# Patient Record
Sex: FEMALE | Race: WHITE
[De-identification: names, ages, dates, MRNs, and addresses within clinical notes are randomized per-mention and may not be internally consistent; named-entity substitution may affect disease eponyms.]

---

## 2017-02-27 ENCOUNTER — HOSPITAL ENCOUNTER (OUTPATIENT)
Dept: HOSPITAL 61 - PCVCCLINIC | Age: 67
Discharge: HOME | End: 2017-02-27
Attending: INTERNAL MEDICINE
Payer: MEDICARE

## 2017-02-27 DIAGNOSIS — I25.10: Primary | ICD-10-CM

## 2017-02-27 DIAGNOSIS — I10: ICD-10-CM

## 2017-02-27 DIAGNOSIS — E78.5: ICD-10-CM

## 2017-02-27 PROCEDURE — 93005 ELECTROCARDIOGRAM TRACING: CPT

## 2017-02-27 PROCEDURE — G0463 HOSPITAL OUTPT CLINIC VISIT: HCPCS

## 2017-02-27 PROCEDURE — 80061 LIPID PANEL: CPT

## 2018-03-12 ENCOUNTER — HOSPITAL ENCOUNTER (OUTPATIENT)
Dept: HOSPITAL 61 - PCVCIMAG | Age: 68
Discharge: HOME | End: 2018-03-12
Attending: INTERNAL MEDICINE
Payer: MEDICARE

## 2018-03-12 DIAGNOSIS — I10: ICD-10-CM

## 2018-03-12 DIAGNOSIS — Z87.891: ICD-10-CM

## 2018-03-12 DIAGNOSIS — E78.5: ICD-10-CM

## 2018-03-12 DIAGNOSIS — Z79.899: ICD-10-CM

## 2018-03-12 DIAGNOSIS — I25.10: ICD-10-CM

## 2018-03-12 DIAGNOSIS — I65.23: Primary | ICD-10-CM

## 2018-03-12 DIAGNOSIS — Z79.82: ICD-10-CM

## 2018-03-12 PROCEDURE — 93005 ELECTROCARDIOGRAM TRACING: CPT

## 2018-03-12 PROCEDURE — 80061 LIPID PANEL: CPT

## 2018-03-12 PROCEDURE — 93880 EXTRACRANIAL BILAT STUDY: CPT

## 2018-09-12 ENCOUNTER — HOSPITAL ENCOUNTER (OUTPATIENT)
Dept: HOSPITAL 61 - PCVCCLINIC | Age: 68
Discharge: HOME | End: 2018-09-12
Attending: INTERNAL MEDICINE
Payer: MEDICARE

## 2018-09-12 DIAGNOSIS — I10: ICD-10-CM

## 2018-09-12 DIAGNOSIS — Z87.891: ICD-10-CM

## 2018-09-12 DIAGNOSIS — I25.10: Primary | ICD-10-CM

## 2018-09-12 DIAGNOSIS — Z79.82: ICD-10-CM

## 2018-09-12 DIAGNOSIS — I65.23: ICD-10-CM

## 2018-09-12 DIAGNOSIS — E78.5: ICD-10-CM

## 2018-09-12 PROCEDURE — 93005 ELECTROCARDIOGRAM TRACING: CPT

## 2018-09-12 PROCEDURE — G0463 HOSPITAL OUTPT CLINIC VISIT: HCPCS

## 2018-09-12 PROCEDURE — 80061 LIPID PANEL: CPT

## 2019-03-13 ENCOUNTER — HOSPITAL ENCOUNTER (OUTPATIENT)
Dept: HOSPITAL 61 - PCVCIMAG | Age: 69
Discharge: HOME | End: 2019-03-13
Attending: INTERNAL MEDICINE
Payer: MEDICARE

## 2019-03-13 DIAGNOSIS — E78.5: ICD-10-CM

## 2019-03-13 DIAGNOSIS — I65.23: Primary | ICD-10-CM

## 2019-03-13 DIAGNOSIS — I10: ICD-10-CM

## 2019-03-13 DIAGNOSIS — I25.10: ICD-10-CM

## 2019-03-13 PROCEDURE — 93325 DOPPLER ECHO COLOR FLOW MAPG: CPT

## 2019-03-13 PROCEDURE — 93351 STRESS TTE COMPLETE: CPT

## 2019-03-13 PROCEDURE — 93880 EXTRACRANIAL BILAT STUDY: CPT

## 2019-03-13 NOTE — PCVCIMAG
--------------- APPROVED REPORT 

--------------





Indications

Stenosis



Risk Factors

Hypertension: 

Hyperlipidemia 



Doppler Spectral Velocity Analysis

PSV  /  EDVPSV  /  EDV

ECA (R)     98 / 8 cm/sECA (L)     88 / 8 cm/s



dICA (R)    65 / 21 cm/sdICA (L)     65 / 24 cm/s

[place] (R)     76 / 17 cm/smICA (L)     87 / 21 cm/s

pICA (R)     173 / 39 cm/spICA (L)     110 / 19 cm/s



Bulb (R)        100 / 23 cm/sBulb (L)         58 / 15 cm/s



dCCA (R)     73 / 16 cm/sdCCA (L)     71 / 17 cm/s

mCCA (R)    79 / 18 cm/smCCA (L)    74 / 17 cm/s



Vert (R)     53 / 12 cm/sVert (L)     83 / 26 cm/s

ICA/CCA     2.17ICA/CCA     1.49



Basic Measurements

Blood Pressure:                                                 

Pulses:                       

                                Right           Left               

             RightLeft

Brachial(Sitting)               152/[id number]/74mmHgTemporal     





Real Time B-Mode Imaging

Vert. (R)AntegradeVert. (L)Antegrade



Findings

The right carotid bulb has moderately severe  plaque.

The right proximal internal carotid artery shows 60-70% stenosis.

The right common carotid artery shows no significant stenosis.

The right external carotid artery shows no significant stenosis.

The left carotid bulb has moderate calcified plaque.

The left proximal internal carotid artery shows <40% stenosis.

The left common carotid artery shows <40% stenosis.

The left external carotid artery shows no significant 

stenosis.



Conclusion

1.  Right internal carotid artery stenosis (60-70%).

2. Left internal carotid artery stenosis (<40%)

2.  Antegrade vertebral flow

## 2019-03-13 NOTE — PCVCIMAG
--------------- APPROVED REPORT --------------





Study performed:  03/13/2019 14:22:03



Exam:  Stress Echocardiogram

Indication: CAD s/p PCI,stent to LAD,HTN,DSLP

Patient Location: Echo lab

Stress Nurse: Meri Gao RN

Room #: 2

Status: routine



Ht: 5 ft 6 in  

HR: 100 bpm      BP: 148/74 mmHg

Rhythm: NSR



Medical History

Medical History: CAD s/p stent, Hyperlipidemia, HTN

Cardiac Risk Factors: HTN, Hyperlipidemia

Previous Cardiac Procedures: PCI

Pretest Chest Pain Characteristics: No chest pain

Exercise History: Indeterminate



Procedure

The patient underwent an Exercise Stress Test using the Vinod 

Protocol. Blood pressure, heart rate, and EKG were monitored.

An Echocardiogram was performed by technician in four stages in quad 

fashion.  At peak stress, four selected images were obtained and 

placed side by side with resting images for comparison.



Stress Test Details

Stress Test:  Exercise stress testing was performed using a Vinod 

protocol.

HR

Resting HR:            100 bpmMax Heart Rate (APMHR): 151 bpm 



Max HR Achieved:  148 bpmTarget HR (85% APMHR): 128 bpm

% of APMHR:         98

Recovery HR:            97 bpm

HR response to stress: Normal HR response to stress,mild tachycardia 

at rest



BP

Resting BP:  148/74 mmHg

Max BP:       176/78 mmHg

Recovery BP:       132/68 mmHg

BP response to stress: Normal blood pressure response to 

stress.

ECG

Resting ECG:  Sinus Rhythm

Stress ECG:     Sinus Rhythm

ST Change: Non-ischemic

Maximum ST Deviation: 0 mm

Arrhythmia:    rare PVCs, PACs

Recovery ECG: Sinus Rhythm

Recovery ST Change: Non-ischemic

Recovery ST Deviation: 0 mm

Recovery Arrhythmia: Rare pvc



Clinical

Reason for Termination: Maximal effort

Stress Symptoms: fatigue

Exercise duration: 6 min 00 sec

Highest Stage Achieved: Stage 2: 2.5 mph at 12% grade. 

Exercise capacity: 7.0 METs

Overall Exercise Capacity for Age: Poor

Scale: Sedentary

Angina Score: None

No complications.



Stress ECG Conclusion

The patient exercised according to the VINOD protocol for 6:00  mins; 

achieving a work level of 7.0 METS. 

The resting heart rate of 100 bpm mendez to a maximum heart rate of 148 

bpm. 

This value represents 98 % of the maximal, age-predicted heart rate. 

The resting blood pressure of148/74  mmHg, mendez to a maximum blood 

pressure of 176/78 mmHg. 

The exercise test was stopped due to fatigue .

Naylor Treadmill Score is 6.0 which is Low risk.



Pre-Stress Echo

The resting Echocardiogram showed normal left ventricular 

contractility with an estimated Ejection Fraction of about 55-60%. 

Normal wall motion in all segments on baseline images.



Post-Stress Echo

The stress Echocardiogram showed normal left ventricular 

contractility with an estimated Ejection Fraction of about 65-70%. 

Normal augmentation of wall motion in all segments on post stress 

images.



Clinical

No clinical or ECG evidence for ischemia.



Conclusion

Clinical Response:  Non-ischemic

Exercise Capacity:  Below Average

Stress ECG Response:  Non-ischemic

Stress Echo Images:  Non-ischemic

No clinical, EKG or echocardiographic evidence for ischemia. 

No echocardiographic evidence for exercise induced ischemia.

Normal stress echocardiogram with maximal exercise 

stress.



<Conclusion>

No clinical, EKG or echocardiographic evidence for ischemia. 

No echocardiographic evidence for exercise induced ischemia.

Normal stress echocardiogram with maximal exercise stress.

## 2019-09-17 ENCOUNTER — HOSPITAL ENCOUNTER (OUTPATIENT)
Dept: HOSPITAL 61 - PCVCCLINIC | Age: 69
Discharge: HOME | End: 2019-09-17
Attending: INTERNAL MEDICINE
Payer: MEDICARE

## 2019-09-17 DIAGNOSIS — I65.23: ICD-10-CM

## 2019-09-17 DIAGNOSIS — Z87.891: ICD-10-CM

## 2019-09-17 DIAGNOSIS — I25.10: Primary | ICD-10-CM

## 2019-09-17 DIAGNOSIS — Z79.82: ICD-10-CM

## 2019-09-17 DIAGNOSIS — I10: ICD-10-CM

## 2019-09-17 DIAGNOSIS — E78.5: ICD-10-CM

## 2019-09-17 PROCEDURE — 36415 COLL VENOUS BLD VENIPUNCTURE: CPT

## 2019-09-17 PROCEDURE — 80061 LIPID PANEL: CPT

## 2019-09-17 PROCEDURE — G0463 HOSPITAL OUTPT CLINIC VISIT: HCPCS

## 2019-09-17 PROCEDURE — 93005 ELECTROCARDIOGRAM TRACING: CPT

## 2020-03-17 ENCOUNTER — HOSPITAL ENCOUNTER (OUTPATIENT)
Dept: HOSPITAL 35 - SJCVCIMAG | Age: 70
End: 2020-03-17
Attending: INTERNAL MEDICINE
Payer: COMMERCIAL

## 2020-03-17 DIAGNOSIS — I65.23: Primary | ICD-10-CM

## 2020-03-17 DIAGNOSIS — E78.5: ICD-10-CM

## 2020-03-17 DIAGNOSIS — I25.10: ICD-10-CM

## 2020-03-17 DIAGNOSIS — I10: ICD-10-CM

## 2020-09-29 ENCOUNTER — HOSPITAL ENCOUNTER (OUTPATIENT)
Dept: HOSPITAL 35 - SJCVC | Age: 70
End: 2020-09-29
Attending: INTERNAL MEDICINE
Payer: COMMERCIAL

## 2020-09-29 DIAGNOSIS — Z87.891: ICD-10-CM

## 2020-09-29 DIAGNOSIS — I25.10: ICD-10-CM

## 2020-09-29 DIAGNOSIS — R94.31: Primary | ICD-10-CM

## 2020-09-29 DIAGNOSIS — I10: ICD-10-CM

## 2020-09-29 DIAGNOSIS — E78.5: ICD-10-CM

## 2020-09-29 DIAGNOSIS — I65.23: ICD-10-CM

## 2020-09-29 DIAGNOSIS — Z79.899: ICD-10-CM

## 2020-10-06 ENCOUNTER — HOSPITAL ENCOUNTER (OUTPATIENT)
Dept: HOSPITAL 35 - SJCVCIMAG | Age: 70
End: 2020-10-06
Attending: INTERNAL MEDICINE
Payer: COMMERCIAL

## 2020-10-06 DIAGNOSIS — I25.10: ICD-10-CM

## 2020-10-06 DIAGNOSIS — Z79.899: ICD-10-CM

## 2020-10-06 DIAGNOSIS — I10: ICD-10-CM

## 2020-10-06 DIAGNOSIS — E78.5: ICD-10-CM

## 2020-10-06 DIAGNOSIS — K55.1: Primary | ICD-10-CM

## 2021-03-31 ENCOUNTER — HOSPITAL ENCOUNTER (OUTPATIENT)
Dept: HOSPITAL 35 - SJCVCIMAG | Age: 71
End: 2021-03-31
Attending: INTERNAL MEDICINE
Payer: COMMERCIAL

## 2021-03-31 DIAGNOSIS — R94.31: Primary | ICD-10-CM

## 2021-03-31 DIAGNOSIS — E78.5: ICD-10-CM

## 2021-03-31 DIAGNOSIS — Z79.82: ICD-10-CM

## 2021-03-31 DIAGNOSIS — I25.10: ICD-10-CM

## 2021-03-31 DIAGNOSIS — I65.23: ICD-10-CM

## 2021-03-31 DIAGNOSIS — I10: ICD-10-CM

## 2021-03-31 DIAGNOSIS — Z79.899: ICD-10-CM

## 2021-09-30 ENCOUNTER — HOSPITAL ENCOUNTER (OUTPATIENT)
Dept: HOSPITAL 35 - SJCVCIMAG | Age: 71
End: 2021-09-30
Attending: INTERNAL MEDICINE
Payer: COMMERCIAL

## 2021-09-30 DIAGNOSIS — E78.5: ICD-10-CM

## 2021-09-30 DIAGNOSIS — Z72.89: ICD-10-CM

## 2021-09-30 DIAGNOSIS — I25.10: Primary | ICD-10-CM

## 2021-09-30 DIAGNOSIS — E78.00: ICD-10-CM

## 2021-09-30 DIAGNOSIS — I10: ICD-10-CM

## 2021-09-30 DIAGNOSIS — Z79.899: ICD-10-CM

## 2021-09-30 DIAGNOSIS — I65.23: ICD-10-CM

## 2021-09-30 DIAGNOSIS — Z87.891: ICD-10-CM
